# Patient Record
(demographics unavailable — no encounter records)

---

## 2024-10-24 NOTE — HISTORY OF PRESENT ILLNESS
[FreeTextEntry1] : Patient is a 56-year-old white male well-known to my practice.  He has known hyperlipidemia, moderate obesity and prediabetes.  He is under the care of the of Dr. Deja Lowry and was followed in my office for originally profound asymptomatic bradycardia which became symptomatic in March 2021.  Holter revealed profound bradycardia with heart rates below 30 at night and a permanent pacemaker was placed.  He was lethargic and feeling poorly and was having brief episodes of SVT prior to his pacemaker being inserted.  Dr. Zak Plummer placed his pacemaker and since that time he has felt "fantastic".  Patient presents today with no new or active cardiac symptoms, he he is on Mounjaro under the direction of Dr. Deja Lowry for his known prediabetes.  He is now lost an additional 6 pounds currently down to 206 and he continues to tolerate Mounjaro and feels well.  Patient is active but does not do strenuous exercise regular basis.  He sees Dr. Deja Lowry for endocrinology and has been placed on Mounjaro and has had a good response in the first several months of therapy.  His weight is currently 206 and remains on relatively low-dose Mounjaro.    Patient was noted to have recurrent SVTs early after his pacemaker was implanted his base rate was right raised to 65 and hysteresis changed to 55.  His pacemaker evaluation today showed no evidence of SVT but he did have a 7 beat episode of a pacemaker mediated tachycardia that he was unaware of and asymptomatic from.  He has been fine since these changes were implemented.  He has been encouraged to have a sleep apnea evaluation but has not implemented that therapy.  He presents today otherwise looking and feeling well with no new or active cardiovascular concerns or complaints.  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 Maldonado several years ago.  Other issues include hyperlipidemia and gout neither 1 of which he has complaints regarding today.  Patient is notes he is in the middle of a formal divorce, working things out amicably and has known active signs of depression at this point.  He does maintain a medical regimen and followed by psychiatry

## 2024-10-24 NOTE — PROCEDURE
[No] : not [NSR] : normal sinus rhythm [Pacemaker] : pacemaker [DDDR] : DDDR [Threshold Testing Performed] : Threshold testing was performed [Lead Imp:  ___ohms] : lead impedance was [unfilled] ohms [Sensing Amplitude ___mv] : sensing amplitude was [unfilled] mv [___V @] : [unfilled] V [___ ms] : [unfilled] ms [None] : none [Voltage: ___ volts] : Voltage was [unfilled] volts [Magnet Rate: ___ Ppm] : magnet rate was [unfilled] Ppm [de-identified] : Abbott [de-identified] : Assurity MRI 1101 [de-identified] : [id number] [de-identified] : 3/11/2021 [de-identified] : 6-6.8 years  [de-identified] : AP 87%  1.3% PVC <1%   1 PMT episode - pt appears to have ST, AS/ and PMT initiated and termination of episode.   DVC 3 months.  DRC 6 months.  Pt has follow up OV with JG.   Sincerely,   Cristin Paz, [facility] Patient's history, testing, and plan reviewed with supervising MD: Dr. Joel Goldberg.

## 2024-10-24 NOTE — DISCUSSION/SUMMARY
[FreeTextEntry1] : Patient is a 56-year-old white male with mild obesity who continues to lose a significant amount of weight down from 226 in September to 206 today on Mounjaro.  Patient looks and feels well.  He denies all cardiovascular symptoms without chest pain, shortness of breath, palpitations or dizziness.    His pacemaker was evaluated today and is functioning properly with adequate battery life in the last 3-month interval had only 1 7 beat run of a pacemaker mediated tachycardia but no atrial arrhythmias.  He seems has responded to  low-dose beta-blockers to attempt to suppress this.  If this is unsuccessful refractory period will be adjusted  Patient was reassured regarding his cardiovascular status.  His labs were reviewed and they were all acceptable. He previously had sleep apnea and sick sinus syndrome for which a permanent pacemaker was placed in May 2021. Patient presents to the office today feeling well without any complaints referable to his pacemaker. His weight is down yet he is not exercising in an intense fashion but is asymptomatic with all activities and plans to be more aggressive in the gym in the very near future.   His pacemaker was analyzed and there is adequate battery life, there has been no sustained events that he is asymptomatic of with ideal thresholds. No changes were made to his device today.  Patient was reassured. A full set laboratory data was obtained and he will be called when results become available. No changes were made to his medical regimen and no changes made to his device today. He was recommended to return to the office in 4 months time for interval follow-up and updated pacer check.  And an echocardiogram given his atrial arrhythmias, prediabetes and sleep apnea.  Joel Goldberg, MD, FACC

## 2024-10-24 NOTE — PHYSICAL EXAM
[Well Developed] : well developed [Well Nourished] : well nourished [No Acute Distress] : no acute distress [Normal Conjunctiva] : normal conjunctiva [Normal] : normal venous pressure, no carotid bruit [Normal Venous Pressure] : normal venous pressure [Normal S1, S2] : normal S1, S2 [No Murmur] : no murmur [No Rub] : no rub [No Gallop] : no gallop [Clear Lung Fields] : clear lung fields [No Respiratory Distress] : no respiratory distress  [Soft] : abdomen soft [Non Tender] : non-tender [No Masses/organomegaly] : no masses/organomegaly [No Edema] : no edema [No Cyanosis] : no cyanosis [No Clubbing] : no clubbing [Normal Radial B/L] : normal radial B/L [Normal PT B/L] : normal PT B/L [Normal DP B/L] : normal DP B/L [No Focal Deficits] : no focal deficits

## 2024-10-24 NOTE — HISTORY OF PRESENT ILLNESS
[None] : The patient complains of no symptoms [de-identified] : Patient is a very pleasant 55-year-old male who presents the office today for routine pacemaker device interrogation.

## 2024-10-24 NOTE — PROCEDURE
[No] : not [NSR] : normal sinus rhythm [Pacemaker] : pacemaker [DDDR] : DDDR [Threshold Testing Performed] : Threshold testing was performed [Lead Imp:  ___ohms] : lead impedance was [unfilled] ohms [Sensing Amplitude ___mv] : sensing amplitude was [unfilled] mv [___V @] : [unfilled] V [___ ms] : [unfilled] ms [None] : none [Voltage: ___ volts] : Voltage was [unfilled] volts [Magnet Rate: ___ Ppm] : magnet rate was [unfilled] Ppm [de-identified] : Abbott [de-identified] : Assurity MRI 8661 [de-identified] : [id number] [de-identified] : 3/11/2021 [de-identified] : 6-6.8 years  [de-identified] : AP 87%  1.3% PVC <1%   1 PMT episode - pt appears to have ST, AS/ and PMT initiated and termination of episode.   DVC 3 months.  DRC 6 months.  Pt has follow up OV with JG.   Sincerely,   Cristin Paz, [facility] Patient's history, testing, and plan reviewed with supervising MD: Dr. Joel Goldberg.

## 2024-10-24 NOTE — REASON FOR VISIT
[CV Risk Factors and Non-Cardiac Disease] : CV risk factors and non-cardiac disease [Arrhythmia/ECG Abnorrmalities] : arrhythmia/ECG abnormalities [FreeTextEntry1] : Patient is a 55-year-old white male well-known to the practice who presents the office today for his routine interval follow-up as well as for an updated pacer check.  Patient presents today noting that he is been feeling quite well.  He is lost a significant amount of weight on Mounjaro prescribed by Dr. Lowry weight down from 226 to 206.  He is only lost an additional 4 pounds in the last several months and is not clear if his dose has been escalated.  He presents today noting that he is feeling well without any new or active cardiovascular concerns or complaints and no complaints referable to his pacemaker..   Patient is walking during activities he denies chest pain, shortness of breath, palpitations or dizziness he has not any syncope.  His blood pressures been running quite low but he has no symptomatology

## 2024-10-24 NOTE — REVIEW OF SYSTEMS
[Fever] : no fever [Headache] : no headache [Feeling Fatigued] : not feeling fatigued [Weight Loss (___ Lbs)] : [unfilled] ~Ulb weight loss [SOB] : no shortness of breath [Dyspnea on exertion] : not dyspnea during exertion [Chest Discomfort] : no chest discomfort [Lower Ext Edema] : no extremity edema [Leg Claudication] : no intermittent leg claudication [Palpitations] : no palpitations [Orthopnea] : no orthopnea [PND] : no PND [Syncope] : no syncope [Abdominal Pain] : no abdominal pain [Nausea] : no nausea [Vomiting] : no vomiting [Heartburn] : no heartburn [Change in Appetite] : no change in appetite [Change In The Stool] : no change in stool [Dysphagia] : no dysphagia [Diarrhea] : diarrhea [Constipation] : no constipation [Blood in stool] : no blood in stoo [Rash] : no rash [Depression] : no depression [Anxiety] : no anxiety [Negative] : Musculoskeletal

## 2024-10-24 NOTE — HISTORY OF PRESENT ILLNESS
[None] : The patient complains of no symptoms [de-identified] : Patient is a very pleasant 55-year-old male who presents the office today for routine pacemaker device interrogation.

## 2025-02-20 NOTE — PROCEDURE
[No] : not [NSR] : normal sinus rhythm [See Device Printout] : See device printout [Pacemaker] : pacemaker [DDDR] : DDDR [Voltage: ___ volts] : Voltage was [unfilled] volts [Magnet Rate: ___ Ppm] : magnet rate was [unfilled] Ppm [Threshold Testing Performed] : Threshold testing was performed [Lead Imp:  ___ohms] : lead impedance was [unfilled] ohms [Sensing Amplitude ___mv] : sensing amplitude was [unfilled] mv [___V @] : [unfilled] V [___ ms] : [unfilled] ms [None] : none [de-identified] : Abbott [de-identified] : Assurity MRI 6268 [de-identified] : [id number] [de-identified] : 3/11/2021 [de-identified] :  [de-identified] : 5.7-6.4 years  [de-identified] : AP 86%  1.4%  2 PMT episodes - pt appears to have ST, AS/ and PMT initiated and termination of episode.   DVC 3 months.  DRC 6 months.  Pt has follow up OV with JG.   Sincerely,   Cristin Paz, [facility] Patient's history, testing, and plan reviewed with supervising MD: Dr. Joel Goldberg.  No lesions; no rash

## 2025-02-20 NOTE — PROCEDURE
[No] : not [NSR] : normal sinus rhythm [See Device Printout] : See device printout [Pacemaker] : pacemaker [DDDR] : DDDR [Voltage: ___ volts] : Voltage was [unfilled] volts [Magnet Rate: ___ Ppm] : magnet rate was [unfilled] Ppm [Threshold Testing Performed] : Threshold testing was performed [Lead Imp:  ___ohms] : lead impedance was [unfilled] ohms [Sensing Amplitude ___mv] : sensing amplitude was [unfilled] mv [___V @] : [unfilled] V [___ ms] : [unfilled] ms [None] : none [de-identified] : Abbott [de-identified] : Assurity MRI 2432 [de-identified] : [id number] [de-identified] : 3/11/2021 [de-identified] :  [de-identified] : 5.7-6.4 years  [de-identified] : AP 86%  1.4%  2 PMT episodes - pt appears to have ST, AS/ and PMT initiated and termination of episode.   DVC 3 months.  DRC 6 months.  Pt has follow up OV with JG.   Sincerely,   Cristin Paz, [facility] Patient's history, testing, and plan reviewed with supervising MD: Dr. Joel Goldberg.

## 2025-02-20 NOTE — HISTORY OF PRESENT ILLNESS
[None] : The patient complains of no symptoms [de-identified] : Patient is a very pleasant 55-year-old male who presents the office today for routine pacemaker device interrogation.

## 2025-02-20 NOTE — HISTORY OF PRESENT ILLNESS
[None] : The patient complains of no symptoms [de-identified] : Patient is a very pleasant 55-year-old male who presents the office today for routine pacemaker device interrogation.

## 2025-02-20 NOTE — HISTORY OF PRESENT ILLNESS
[FreeTextEntry1] : Patient is a 55-year-old white male well-known to my practice.  He has known hyperlipidemia, previously with moderate obesity and prediabetes.  He is under the care of the of Dr. Deja Lowry for endocrine and was followed in my office for originally profound asymptomatic bradycardia which became symptomatic in March 2021.  Holter revealed profound bradycardia with heart rates below 30 at night and a permanent pacemaker was placed.  He was lethargic and feeling poorly and was having brief episodes of SVT prior to his pacemaker being inserted.  Dr. Zak Plummer placed his pacemaker and since that time he has felt "fantastic".  He presents today with no complaints referable to his pacemaker.  For the most part he is atrially pacing with V sensing but not pacer dependent  Patient is active but does not do strenuous exercise regular basis.  He sees Dr. Deja Lowry for endocrinology and has been placed on Ozempic and has had a good response initially but really no weight loss since May.  It was discussed with him that she might want to consider trying to switch him to Mounjaro and titrate upwards as his weight goal is in the 185-195 range.  Patient was noted to have recurrent SVTs early after his pacemaker was implanted his base rate was right raised to 65 and hysteresis changed to 55.  He only had 1 brief episode that he was unaware of on evaluation of his pacemaker today.  He has been fine since these changes were implemented.  He has been encouraged to have a sleep apnea evaluation but has not implemented that therapy.  He presents today otherwise looking and feeling well with no new or active cardiovascular concerns or complaints.  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 Maldonado several years ago.  Other issues include hyperlipidemia and gout neither one  of which he has complaints regarding today

## 2025-02-20 NOTE — DISCUSSION/SUMMARY
[EKG obtained to assist in diagnosis and management of assessed problem(s)] : EKG obtained to assist in diagnosis and management of assessed problem(s) [FreeTextEntry1] : Patient is a 56-year-old white male with mild obesity who has stopped losing weight on his current dose of Ozempic.  He sought Dr. Lowry today, his weight is at 207 was 204 in May.  It was suggested to him that he discuss switching to Mounjaro for better efficacy and continued weight loss.  Our goal is to get him to the  185-195 range.  Patient was informed that his pacemaker is functioning properly he has very minimal episodes of SVT, looks and feels well otherwise has no complaints today.  Patient was advised to attempt to continue to lose weight, increase level of exercise and otherwise reassured regarding his cardiovascular status.  Patient was recommended return to the office in 4 months for reevaluation and pacer check.  He refuses to consider stress testing at this time but is active with no symptoms.  He will discuss changing to Mounjaro with Dr. Deja Lowry.  A full set of laboratory data was requested and he will be called and the results are obtained and analyzed.  Joel Goldberg, MD, FACC   Joel Goldberg, MD, FACC

## 2025-02-20 NOTE — CARDIOLOGY SUMMARY
[de-identified] : (2/20/2025) EKG: NSR at 65 bpm with a pacing V sensing demonstrated.  Frontal QRS axis of 0 degrees intermittent V pacing with a bundle branch block pattern [de-identified] : (2/20/2025) ECHOCARDIOGRAPHIC CONCLUSIONS:  1. Left ventricular systolic function is normal with an ejection fraction visually estimated at 60 to 65 %. 2. Normal left ventricular diastolic function. 3. Device lead is visualized in the right heart. 4. Ascending aorta is mildly dilated, measuring 3.80 cm (indexed 1.82 cm/m). 5. Trace to mild mitral regurgitation. 6. Trace tricuspid regurgitation. 7. Estimated pulmonary artery systolic pressure is 23 mmHg. 8. No pericardial effusion seen. 9. No prior echocardiogram is available for comparison.  [de-identified] : (2/20/2025) Device Check The patient is not pacemaker dependent.   Underlying Rhythm: normal sinus rhythm . See device printout.   Device Type: pacemaker   Pacemaker/ICD : Abbott.   Model: Kinjal MRI 2272. Serial Number: [id number]. Date of Implant: 3/11/2021.   Mode: DDDR. Rate: .   Battery Status: Voltage was 3.01 volts and magnet rate was 100 Ppm 5.7-6.4 years.   Measurement: Threshold testing was performed.   Atrial: lead impedance was 310 ohms. sensing amplitude was >5 mv. Pacing Threshold: 1.25 V @ 0.4 ms.   Rt Ventricle:. lead impedance was 450 ohms. sensing amplitude was >12 mv. Pacing Threshold: <0.25 V @ 0.4 ms ms.   Program Changes: none.   Comments:. AP 86%  1.4%  2 PMT episodes - pt appears to have ST, AS/ and PMT initiated and termination of episode.  DVC 3 months. DRC 6 months. Pt has follow up OV with JG.  Sincerely, Cristin Paz, [facility] Patient's history, testing, and plan reviewed with supervising MD: Dr. Joel Goldberg.

## 2025-02-20 NOTE — REASON FOR VISIT
[CV Risk Factors and Non-Cardiac Disease] : CV risk factors and non-cardiac disease [Arrhythmia/ECG Abnorrmalities] : arrhythmia/ECG abnormalities [Structural Heart and Valve Disease] : structural heart and valve disease [Hyperlipidemia] : hyperlipidemia [Hypertension] : hypertension [FreeTextEntry1] : Patient is a 56-year-old white male well-known to the practice who presents the office today for his routine interval follow-up as well as for an updated pacer check.  Patient presents today noting that he is been feeling quite well.  He is lost a significant amount of weight, approximately 30 pounds on Wegovy.  He has not been tried on Mounjaro but he does admit that he has not reached his goal and additional weight loss has not occurred on his current regimen.  Patient saw Dr. Lowry earlier today current weight is at 207 he was at 204 in May.  He presents today noting that he is feeling well without any new or active cardiovascular concerns or complaints and no complaints referable to his pacemaker which was analyzed today and functioning properly.  Data entered under separate entry same date of service...   Patient notes he is active and walking on a regular basis regardless of weather.  During these activities he denies chest pain, shortness of breath, palpitations or dizziness he has not any syncope.  His blood pressures been running quite low but he has no symptomatology

## 2025-02-20 NOTE — REVIEW OF SYSTEMS
[Weight Loss (___ Lbs)] : [unfilled] ~Ulb weight loss [Negative] : Musculoskeletal [Fever] : no fever [Headache] : no headache [Feeling Fatigued] : not feeling fatigued [SOB] : no shortness of breath [Dyspnea on exertion] : not dyspnea during exertion [Chest Discomfort] : no chest discomfort [Lower Ext Edema] : no extremity edema [Leg Claudication] : no intermittent leg claudication [Palpitations] : no palpitations [Orthopnea] : no orthopnea [PND] : no PND [Syncope] : no syncope [Abdominal Pain] : no abdominal pain [Nausea] : no nausea [Vomiting] : no vomiting [Heartburn] : no heartburn [Change in Appetite] : no change in appetite [Change In The Stool] : no change in stool [Dysphagia] : no dysphagia [Diarrhea] : diarrhea [Constipation] : no constipation [Blood in stool] : no blood in stoo [Rash] : no rash [Depression] : no depression [Anxiety] : no anxiety [FreeTextEntry2] : Weight loss is stagnated on Ozempic

## 2025-06-26 NOTE — PROCEDURE
[No] : not [NSR] : normal sinus rhythm [See Device Printout] : See device printout [Pacemaker] : pacemaker [DDDR] : DDDR [Voltage: ___ volts] : Voltage was [unfilled] volts [Magnet Rate: ___ Ppm] : magnet rate was [unfilled] Ppm [Threshold Testing Performed] : Threshold testing was performed [Lead Imp:  ___ohms] : lead impedance was [unfilled] ohms [Sensing Amplitude ___mv] : sensing amplitude was [unfilled] mv [___V @] : [unfilled] V [___ ms] : [unfilled] ms [None] : none [Counters Reset] : the counters were reset [de-identified] : Abbott [de-identified] : Assurity MRI 5506 [de-identified] : [id number] [de-identified] : 3/11/2021 [de-identified] :  [de-identified] : 5.6-6.4 years [de-identified] : AP 75%  1.6%  21 PMT episodes - pt appears to have ST, AS/ and PMT initiated and termination of episode.  1 episode of HVR - appears to be a sinus tachycardia.   DVC 3 months.  DRC 6 months.  Pt has follow up OV with JG.   Sincerely,   Cristin Paz, [facility] Patient's history, testing, and plan reviewed with supervising MD: Dr. Joel Goldberg.

## 2025-06-26 NOTE — DISCUSSION/SUMMARY
[FreeTextEntry1] : Patient is a 56-year-old white male with mild obesity who has stopped losing weight but now will be switched to Mounjaro with the approval of   Dr. Lowry; today, his weight is at 207 was 204 in May.  He was advised last time to encourage Dr. Lowry to change him back over to Mounjaro which he has agreed to do, our goal is to get him to the  185-195 range.  Patient was informed that his pacemaker is functioning properly but he had 1 brief tachycardic event that he was unaware of and he was advised to reimplement beta-blockers which he was not instructed discontinue but agrees to reimplement on a once a day basis.  Patient otherwise looks and feels well otherwise has no complaints today.  Patient was advised to attempt to continue to lose weight, increase level of exercise and otherwise reassured regarding his cardiovascular status.  Patient was recommended return to the office in 4 months for reevaluation and pacer check  A full set of laboratory data was requested and he will be called and the results are obtained and analyzed and otherwise he will return to the office in 4 months time for interval follow-up and pacer check.  Joel Goldberg, MD, FACC  [EKG obtained to assist in diagnosis and management of assessed problem(s)] : EKG obtained to assist in diagnosis and management of assessed problem(s)

## 2025-06-26 NOTE — PROCEDURE
[No] : not [NSR] : normal sinus rhythm [See Device Printout] : See device printout [Pacemaker] : pacemaker [DDDR] : DDDR [Voltage: ___ volts] : Voltage was [unfilled] volts [Magnet Rate: ___ Ppm] : magnet rate was [unfilled] Ppm [Threshold Testing Performed] : Threshold testing was performed [Lead Imp:  ___ohms] : lead impedance was [unfilled] ohms [Sensing Amplitude ___mv] : sensing amplitude was [unfilled] mv [___V @] : [unfilled] V [___ ms] : [unfilled] ms [None] : none [Counters Reset] : the counters were reset [de-identified] : Abbott [de-identified] : Assurity MRI 1965 [de-identified] : [id number] [de-identified] : 3/11/2021 [de-identified] :  [de-identified] : 5.6-6.4 years [de-identified] : AP 75%  1.6%  21 PMT episodes - pt appears to have ST, AS/ and PMT initiated and termination of episode.  1 episode of HVR - appears to be a sinus tachycardia.   DVC 3 months.  DRC 6 months.  Pt has follow up OV with JG.   Sincerely,   Cristin Paz, [facility] Patient's history, testing, and plan reviewed with supervising MD: Dr. Joel Goldberg.

## 2025-06-26 NOTE — HISTORY OF PRESENT ILLNESS
[None] : The patient complains of no symptoms [de-identified] : Patient is a very pleasant 55-year-old male who presents the office today for routine pacemaker device interrogation.

## 2025-06-26 NOTE — HISTORY OF PRESENT ILLNESS
[FreeTextEntry1] : Patient is a 56-year-old white male well-known to my practice.  He has known hyperlipidemia, previously with moderate obesity and prediabetes.  He is under the care of the of Dr. Deja Lowry for endocrine and was followed in my office for originally profound asymptomatic bradycardia which became symptomatic in March 2021.  Holter at that time revealed profound bradycardia with heart rates below 30 at night and a permanent pacemaker was placed.  He was lethargic and feeling poorly and was having brief episodes of SVT prior to his pacemaker being inserted.  Dr. Zak Plummer placed his pacemaker and since that time he has felt "fantastic".  He presents today with no complaints referable to his pacemaker.  For the most part he is atrially pacing with V sensing but not pacer dependent patient has been maintained on low-dose beta-blockers which he admittedly self discontinued for unclear reasons.  He was reassured this was not affecting his blood pressure and he remains asymptomatic of a very brief episode of tachycardia.  Patient is active but does not do strenuous exercise regular basis.  He sees Dr. Deja Lowry for endocrinology and has been placed on Wegovy and now will be switched to Mounjaro and says he is stopped losing weight on the Mounjaro and she is agreeable.  his weight goal is in the 185-195 range.  Patient was noted to have recurrent SVTs early after his pacemaker was implanted his base rate was right raised to 65 and hysteresis changed to 55.  He only had 1 brief episode that he was unaware of on evaluation of his pacemaker today.  He has been fine since these changes were implemented.  He has been encouraged to have a sleep apnea evaluation but has not implemented that therapy.  He presents today otherwise looking and feeling well with no new or active cardiovascular concerns or complaints.  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 Maldonado several years ago.  Other issues include hyperlipidemia and gout neither one  of which he has complaints regarding today

## 2025-06-26 NOTE — CARDIOLOGY SUMMARY
[de-identified] : ((6/26/2025) EKG: NSR at 86 bpm with a frontal QRS axis of 0 degrees.  He is a sensing and V sensing on this tracing..  Remainder the tracing is within normal limits.  2/20/2025) EKG: NSR at 65 bpm with a pacing V sensing demonstrated.  Frontal QRS axis of 0 degrees intermittent V pacing with a bundle branch block pattern [de-identified] : (2/20/2025) ECHOCARDIOGRAPHIC CONCLUSIONS:  1. Left ventricular systolic function is normal with an ejection fraction visually estimated at 60 to 65 %. 2. Normal left ventricular diastolic function. 3. Device lead is visualized in the right heart. 4. Ascending aorta is mildly dilated, measuring 3.80 cm (indexed 1.82 cm/m). 5. Trace to mild mitral regurgitation. 6. Trace tricuspid regurgitation. 7. Estimated pulmonary artery systolic pressure is 23 mmHg. 8. No pericardial effusion seen. 9. No prior echocardiogram is available for comparison.  [de-identified] :  (6/26/2025)  Device Check The patient is not pacemaker dependent.   Underlying Rhythm: normal sinus rhythm . See device printout.   Device Type: pacemaker   Pacemaker/ICD : Abbott.   Model: Elevate HR MRI 2272. Serial Number: [id number]. Date of Implant: 3/11/2021.   Mode: DDDR. Rate: .   Battery Status: Voltage was 2.99 volts and magnet rate was 100 Ppm 5.6-6.4 years.   Measurement: Threshold testing was performed.   Atrial: lead impedance was 390 ohms. sensing amplitude was 3.6 mv. Pacing Threshold: 1.25 V @ 0.4 ms.   Rt Ventricle:. lead impedance was 440 ohms. sensing amplitude was mv. Pacing Threshold: <0.25 V @ 0.4 ms ms.   Program Changes: none. The counters were reset.   Comments:. AP 75%  1.6%  21 PMT episodes - pt appears to have ST, AS/ and PMT initiated and termination of episode. 1 episode of HVR - appears to be a sinus tachycardia.  DVC 3 months. DRC 6 months. Pt has follow up OV with JG.  Sincerely, Cristin Paz, [facility] Patient's history, testing, and plan reviewed with supervising MD: Dr. Joel Goldberg.  (2/20/2025) Device Check The patient is not pacemaker dependent.   Underlying Rhythm: normal sinus rhythm . See device printout.   Device Type: pacemaker   Pacemaker/ICD : Abbott.   Model: Elevate HR MRI 2272. Serial Number: [id number]. Date of Implant: 3/11/2021.   Mode: DDDR. Rate: .   Battery Status: Voltage was 3.01 volts and magnet rate was 100 Ppm 5.7-6.4 years.   Measurement: Threshold testing was performed.   Atrial: lead impedance was 310 ohms. sensing amplitude was >5 mv. Pacing Threshold: 1.25 V @ 0.4 ms.   Rt Ventricle:. lead impedance was 450 ohms. sensing amplitude was >12 mv. Pacing Threshold: <0.25 V @ 0.4 ms ms.   Program Changes: none.   Comments:. AP 86%  1.4%  2 PMT episodes - pt appears to have ST, AS/ and PMT initiated and termination of episode.  DVC 3 months. DRC 6 months. Pt has follow up OV with JG.  Sincerely, Cristin Paz, [facility] Patient's history, testing, and plan reviewed with supervising MD: Dr. Joel Goldberg.

## 2025-06-26 NOTE — REASON FOR VISIT
[CV Risk Factors and Non-Cardiac Disease] : CV risk factors and non-cardiac disease [Arrhythmia/ECG Abnorrmalities] : arrhythmia/ECG abnormalities [Structural Heart and Valve Disease] : structural heart and valve disease [Hyperlipidemia] : hyperlipidemia [Hypertension] : hypertension [FreeTextEntry1] : Patient is a 56-year-old white male well-known to the practice with known sick sinus syndrome, has a permanent pacemaker who presents the office today for his routine interval follow-up as well as for an updated pacer check.  Patient presents today noting that he is been feeling quite well.  He is lost a significant amount of weight, approximately 30 pounds on Wegovy and his endocrinologist has now agreed to switch him to 10 mg/week of Mounjaro.  He sees Dr. Deja Lowry who is agreed to proceed in this direction.    When he last presented to the office his weight was identical to today at 207 pounds he presents today noting that he is feeling well without any new or active cardiovascular concerns or complaints and no complaints referable to his pacemaker which was analyzed today and functioning properly he did however have an episode of SVT versus a brief period of A-fib that he was unaware of in May but does admit to having discontinued his beta-blockers.  He was reminded that he has sick sinus and bradycardia tacky and that that medication suppresses his tachycardia events.  This episode was short-lived and he was unaware of it.  (See pacer evaluation)  His pacemaker was analyzed by nurse practitioner today and entered on a separate entry same date of service and otherwise functioning properly..   Patient notes he is active and walking on a regular basis regardless of weather.  During these activities he denies chest pain, shortness of breath, palpitations or dizziness he has not any syncope.  His blood pressures been running quite low, he is not on any antihypertensives but he has no symptomatology

## 2025-06-26 NOTE — HISTORY OF PRESENT ILLNESS
[None] : The patient complains of no symptoms [de-identified] : Patient is a very pleasant 55-year-old male who presents the office today for routine pacemaker device interrogation.

## 2025-06-26 NOTE — CARDIOLOGY SUMMARY
[de-identified] : ((6/26/2025) EKG: NSR at 86 bpm with a frontal QRS axis of 0 degrees.  He is a sensing and V sensing on this tracing..  Remainder the tracing is within normal limits.  2/20/2025) EKG: NSR at 65 bpm with a pacing V sensing demonstrated.  Frontal QRS axis of 0 degrees intermittent V pacing with a bundle branch block pattern [de-identified] : (2/20/2025) ECHOCARDIOGRAPHIC CONCLUSIONS:  1. Left ventricular systolic function is normal with an ejection fraction visually estimated at 60 to 65 %. 2. Normal left ventricular diastolic function. 3. Device lead is visualized in the right heart. 4. Ascending aorta is mildly dilated, measuring 3.80 cm (indexed 1.82 cm/m). 5. Trace to mild mitral regurgitation. 6. Trace tricuspid regurgitation. 7. Estimated pulmonary artery systolic pressure is 23 mmHg. 8. No pericardial effusion seen. 9. No prior echocardiogram is available for comparison.  [de-identified] :  (6/26/2025)  Device Check The patient is not pacemaker dependent.   Underlying Rhythm: normal sinus rhythm . See device printout.   Device Type: pacemaker   Pacemaker/ICD : Abbott.   Model: Purewine MRI 2272. Serial Number: [id number]. Date of Implant: 3/11/2021.   Mode: DDDR. Rate: .   Battery Status: Voltage was 2.99 volts and magnet rate was 100 Ppm 5.6-6.4 years.   Measurement: Threshold testing was performed.   Atrial: lead impedance was 390 ohms. sensing amplitude was 3.6 mv. Pacing Threshold: 1.25 V @ 0.4 ms.   Rt Ventricle:. lead impedance was 440 ohms. sensing amplitude was mv. Pacing Threshold: <0.25 V @ 0.4 ms ms.   Program Changes: none. The counters were reset.   Comments:. AP 75%  1.6%  21 PMT episodes - pt appears to have ST, AS/ and PMT initiated and termination of episode. 1 episode of HVR - appears to be a sinus tachycardia.  DVC 3 months. DRC 6 months. Pt has follow up OV with JG.  Sincerely, Cristin Paz, [facility] Patient's history, testing, and plan reviewed with supervising MD: Dr. Joel Goldberg.  (2/20/2025) Device Check The patient is not pacemaker dependent.   Underlying Rhythm: normal sinus rhythm . See device printout.   Device Type: pacemaker   Pacemaker/ICD : Abbott.   Model: Purewine MRI 2272. Serial Number: [id number]. Date of Implant: 3/11/2021.   Mode: DDDR. Rate: .   Battery Status: Voltage was 3.01 volts and magnet rate was 100 Ppm 5.7-6.4 years.   Measurement: Threshold testing was performed.   Atrial: lead impedance was 310 ohms. sensing amplitude was >5 mv. Pacing Threshold: 1.25 V @ 0.4 ms.   Rt Ventricle:. lead impedance was 450 ohms. sensing amplitude was >12 mv. Pacing Threshold: <0.25 V @ 0.4 ms ms.   Program Changes: none.   Comments:. AP 86%  1.4%  2 PMT episodes - pt appears to have ST, AS/ and PMT initiated and termination of episode.  DVC 3 months. DRC 6 months. Pt has follow up OV with JG.  Sincerely, Cristin Paz, [facility] Patient's history, testing, and plan reviewed with supervising MD: Dr. Joel Goldberg.